# Patient Record
Sex: FEMALE | NOT HISPANIC OR LATINO | URBAN - METROPOLITAN AREA
[De-identification: names, ages, dates, MRNs, and addresses within clinical notes are randomized per-mention and may not be internally consistent; named-entity substitution may affect disease eponyms.]

---

## 2020-06-25 ENCOUNTER — LAB OUTSIDE AN ENCOUNTER (OUTPATIENT)
Dept: URBAN - METROPOLITAN AREA SURGERY CENTER 8 | Facility: SURGERY CENTER | Age: 55
End: 2020-06-25

## 2020-06-25 ENCOUNTER — OFFICE VISIT (OUTPATIENT)
Dept: URBAN - METROPOLITAN AREA CLINIC 35 | Facility: CLINIC | Age: 55
End: 2020-06-25

## 2020-06-25 VITALS
DIASTOLIC BLOOD PRESSURE: 82 MMHG | BODY MASS INDEX: 27 KG/M2 | SYSTOLIC BLOOD PRESSURE: 122 MMHG | HEIGHT: 67 IN | WEIGHT: 172 LBS | TEMPERATURE: 98 F

## 2020-06-25 PROBLEM — 275978004 SCREENING FOR MALIGNANT NEOPLASM OF COLON: Status: ACTIVE | Noted: 2020-06-25

## 2020-06-25 RX ORDER — NAPROXEN SODIUM 220 MG/1
1 TABLET WITH FOOD OR MILK AS NEEDED TABLET ORAL
Status: ACTIVE | COMMUNITY

## 2020-06-25 RX ORDER — SODIUM PICOSULFATE, MAGNESIUM OXIDE, AND ANHYDROUS CITRIC ACID 10; 3.5; 12 MG/160ML; G/160ML; G/160ML
AS DIRECTED LIQUID ORAL
Qty: 1 KIT | OUTPATIENT
Start: 2020-06-25

## 2020-06-25 RX ORDER — ERGOCALCIFEROL (VITAMIN D2) 50 MCG
1 CAPSULE CAPSULE ORAL ONCE A DAY
Qty: 30 | Status: ACTIVE | COMMUNITY

## 2020-06-25 RX ORDER — OMEPRAZOLE 40 MG/1
CAPSULE, DELAYED RELEASE PELLETS ORAL
Status: ACTIVE | COMMUNITY

## 2020-06-25 NOTE — HPI-MIGRATED HPI
;   ;     Abdominal Pain :                    55 year old female patient presents for abdominal discomfort . Patient admits she has been experiencing symptoms intermittently for the past few years . Patient describes symptoms as some sore sensation when she touches abdomen . She admits associated abdominal bloating and distention . She admits taking Oeprazole 40mg as needed for heartburn . Patient admits symptoms are more present when she is hungry or empty stomach especially when she has to fast . Patient denies nausea or vomiting at this time.  Patient denies having any recent imaging.  Patient admits EGD approximately 13 years ago .  ;   Colorectal Cancer Screening :               53 year old male patient presents for colorectal cancer screening consult. Patient is here due to age . Patient admits this will be first colonoscopy. Patient denies family hx of colon cancer. Patient denies family hx of colon polyps. Patient admits  normal bowel habits at this time. Patient denies any current symptoms of rectal bleeding, melena or mucus. Patient denies any concerns at this time;

## 2020-07-08 ENCOUNTER — OFFICE VISIT (OUTPATIENT)
Dept: URBAN - METROPOLITAN AREA CLINIC 35 | Facility: CLINIC | Age: 55
End: 2020-07-08

## 2020-07-14 ENCOUNTER — OFFICE VISIT (OUTPATIENT)
Dept: URBAN - METROPOLITAN AREA SURGERY CENTER 8 | Facility: SURGERY CENTER | Age: 55
End: 2020-07-14

## 2020-07-31 ENCOUNTER — OFFICE VISIT (OUTPATIENT)
Dept: URBAN - METROPOLITAN AREA CLINIC 35 | Facility: CLINIC | Age: 55
End: 2020-07-31

## 2020-07-31 ENCOUNTER — DASHBOARD ENCOUNTERS (OUTPATIENT)
Age: 55
End: 2020-07-31

## 2020-07-31 VITALS
WEIGHT: 173 LBS | HEIGHT: 67 IN | DIASTOLIC BLOOD PRESSURE: 70 MMHG | BODY MASS INDEX: 27.15 KG/M2 | TEMPERATURE: 97.6 F | SYSTOLIC BLOOD PRESSURE: 118 MMHG

## 2020-07-31 PROBLEM — 102614006 GENERALIZED ABDOMINAL PAIN: Status: ACTIVE | Noted: 2020-06-25

## 2020-07-31 PROBLEM — 91985000 BENIGN NEOPLASM OF ASCENDING COLON: Status: ACTIVE | Noted: 2020-07-31

## 2020-07-31 PROBLEM — 249504006 FLATULENCE: Status: ACTIVE | Noted: 2020-06-25

## 2020-07-31 PROBLEM — 16331000 HEARTBURN: Status: ACTIVE | Noted: 2020-06-25

## 2020-07-31 RX ORDER — OMEPRAZOLE 40 MG/1
CAPSULE, DELAYED RELEASE PELLETS ORAL
Status: ACTIVE | COMMUNITY

## 2020-07-31 RX ORDER — ERGOCALCIFEROL (VITAMIN D2) 50 MCG
1 CAPSULE CAPSULE ORAL ONCE A DAY
Qty: 30 | Status: ACTIVE | COMMUNITY

## 2020-07-31 RX ORDER — NAPROXEN SODIUM 220 MG/1
1 TABLET WITH FOOD OR MILK AS NEEDED TABLET ORAL
Status: ACTIVE | COMMUNITY

## 2020-07-31 RX ORDER — SODIUM PICOSULFATE, MAGNESIUM OXIDE, AND ANHYDROUS CITRIC ACID 10; 3.5; 12 MG/160ML; G/160ML; G/160ML
AS DIRECTED LIQUID ORAL
Qty: 1 KIT | Status: DISCONTINUED | COMMUNITY
Start: 2020-06-25

## 2020-07-31 NOTE — HPI-MIGRATED HPI
;   ;     Colonoscopy Follow-Up : Patient presents today for colonoscopy follow up. Patient had colonoscopy completed on 07/14/2020, with results noted below. Patient denies any complications after procedure.  Patient currently admits normal bowel habits. Patient denies rectal bleeding, melena, or mucus;   Abdominal Pain :                   Patient presents for follow up . Patient had EGD completed on 07/14/2020, with results noted below . She admits improvement since last visit with diet change . Hse admits continuing Omeprazole as needed . Patient denies nausea or vomiting .      Of last visit (06/25/2020) 55 year old female patient presents for abdominal discomfort . Patient admits she has been experiencing symptoms intermittently for the past few years . Patient describes symptoms as some sore sensation when she touches abdomen . She admits associated abdominal bloating and distention . She admits taking Oeprazole 40mg as needed for heartburn . Patient admits symptoms are more present when she is hungry or empty stomach especially when she has to fast . Patient denies nausea or vomiting at this time.  Patient denies having any recent imaging.  Patient admits EGD approximately 13 years ago .  ;

## 2020-10-30 ENCOUNTER — TELEPHONE ENCOUNTER (OUTPATIENT)
Dept: URBAN - METROPOLITAN AREA CLINIC 35 | Facility: CLINIC | Age: 55
End: 2020-10-30

## 2024-08-28 ENCOUNTER — OFFICE VISIT (OUTPATIENT)
Dept: URBAN - METROPOLITAN AREA CLINIC 35 | Facility: CLINIC | Age: 59
End: 2024-08-28

## 2024-08-28 NOTE — HPI-SURVEILLANCE COLONOSCOPY
59 year old female patient presents today for surveillance colonoscopy consult. Patient states last colonoscopy was __ years ago with findings of colon polyps.   Patient admits a personal history of colon polyps. Patient admits/denies a family history of colon polyps or colon cancer.  Patient currently admits normal bowel habits.   Patient denies any current symptoms of rectal bleeding, melena or mucus.